# Patient Record
Sex: FEMALE | Race: WHITE | NOT HISPANIC OR LATINO | Employment: FULL TIME | URBAN - METROPOLITAN AREA
[De-identification: names, ages, dates, MRNs, and addresses within clinical notes are randomized per-mention and may not be internally consistent; named-entity substitution may affect disease eponyms.]

---

## 2017-03-06 ENCOUNTER — RECORDS - HEALTHEAST (OUTPATIENT)
Dept: LAB | Facility: CLINIC | Age: 27
End: 2017-03-06

## 2017-03-07 LAB — HIV 1+2 AB+HIV1 P24 AG SERPL QL IA: NEGATIVE

## 2017-03-08 LAB
HBV SURFACE AG SERPL QL IA: NEGATIVE
SYPHILIS RPR SCREEN - HISTORICAL: NORMAL

## 2017-03-10 LAB
HPV INTERPRETATION - HISTORICAL: NORMAL
HPV INTERPRETER - HISTORICAL: NORMAL

## 2017-03-13 ENCOUNTER — RECORDS - HEALTHEAST (OUTPATIENT)
Dept: ADMINISTRATIVE | Facility: OTHER | Age: 27
End: 2017-03-13

## 2017-05-24 ENCOUNTER — HOSPITAL ENCOUNTER (OUTPATIENT)
Dept: ULTRASOUND IMAGING | Facility: CLINIC | Age: 27
Discharge: HOME | End: 2017-05-24
Attending: FAMILY MEDICINE

## 2017-05-24 DIAGNOSIS — Z34.92 NORMAL PREGNANCY, SECOND TRIMESTER: ICD-10-CM

## 2017-05-24 DIAGNOSIS — Z64.1 MULTIGRAVIDA: ICD-10-CM

## 2017-07-10 ENCOUNTER — AMBULATORY - HEALTHEAST (OUTPATIENT)
Dept: SCHEDULING | Facility: CLINIC | Age: 27
End: 2017-07-10

## 2017-07-10 ENCOUNTER — RECORDS - HEALTHEAST (OUTPATIENT)
Dept: ADMINISTRATIVE | Facility: OTHER | Age: 27
End: 2017-07-10

## 2017-07-10 DIAGNOSIS — O28.0 ABNORMAL MATERNAL SERUM SCREENING TEST: ICD-10-CM

## 2017-07-17 ENCOUNTER — HOSPITAL ENCOUNTER (OUTPATIENT)
Dept: OBGYN | Facility: CLINIC | Age: 27
Discharge: HOME | End: 2017-07-17
Attending: FAMILY MEDICINE | Admitting: FAMILY MEDICINE

## 2017-07-24 ENCOUNTER — HOSPITAL ENCOUNTER (OUTPATIENT)
Dept: MEDSURG UNIT | Facility: CLINIC | Age: 27
Discharge: HOME | End: 2017-07-24
Attending: FAMILY MEDICINE | Admitting: FAMILY MEDICINE

## 2017-07-31 ENCOUNTER — HOSPITAL ENCOUNTER (OUTPATIENT)
Dept: MEDSURG UNIT | Facility: CLINIC | Age: 27
Discharge: HOME | End: 2017-07-31
Attending: FAMILY MEDICINE | Admitting: FAMILY MEDICINE

## 2017-08-03 ENCOUNTER — RECORDS - HEALTHEAST (OUTPATIENT)
Dept: ADMINISTRATIVE | Facility: OTHER | Age: 27
End: 2017-08-03

## 2017-08-04 ENCOUNTER — HOSPITAL ENCOUNTER (OUTPATIENT)
Dept: ULTRASOUND IMAGING | Facility: CLINIC | Age: 27
Discharge: HOME | End: 2017-08-04
Attending: FAMILY MEDICINE

## 2017-08-04 DIAGNOSIS — O28.9 ABNORMAL FINDINGS ON PRENATAL SCREENING: ICD-10-CM

## 2017-08-07 ENCOUNTER — HOSPITAL ENCOUNTER (OUTPATIENT)
Dept: MEDSURG UNIT | Facility: CLINIC | Age: 27
Discharge: HOME | End: 2017-08-07
Attending: FAMILY MEDICINE | Admitting: FAMILY MEDICINE

## 2017-08-14 ENCOUNTER — HOSPITAL ENCOUNTER (OUTPATIENT)
Dept: MEDSURG UNIT | Facility: CLINIC | Age: 27
Discharge: HOME | End: 2017-08-14
Attending: FAMILY MEDICINE | Admitting: FAMILY MEDICINE

## 2017-08-21 ENCOUNTER — HOSPITAL ENCOUNTER (OUTPATIENT)
Dept: MEDSURG UNIT | Facility: CLINIC | Age: 27
Discharge: HOME | End: 2017-08-21
Attending: FAMILY MEDICINE | Admitting: FAMILY MEDICINE

## 2017-08-28 ENCOUNTER — HOSPITAL ENCOUNTER (OUTPATIENT)
Dept: MEDSURG UNIT | Facility: CLINIC | Age: 27
Discharge: HOME | End: 2017-08-28
Attending: FAMILY MEDICINE | Admitting: FAMILY MEDICINE

## 2017-08-28 ASSESSMENT — MIFFLIN-ST. JEOR: SCORE: 1725.59

## 2017-09-04 ENCOUNTER — HOSPITAL ENCOUNTER (OUTPATIENT)
Dept: OBGYN | Facility: CLINIC | Age: 27
Discharge: HOME | End: 2017-09-04
Attending: FAMILY MEDICINE | Admitting: FAMILY MEDICINE

## 2017-09-11 ENCOUNTER — RECORDS - HEALTHEAST (OUTPATIENT)
Dept: ADMINISTRATIVE | Facility: OTHER | Age: 27
End: 2017-09-11

## 2017-09-11 ENCOUNTER — HOSPITAL ENCOUNTER (OUTPATIENT)
Dept: MEDSURG UNIT | Facility: CLINIC | Age: 27
Discharge: HOME | End: 2017-09-11
Attending: FAMILY MEDICINE | Admitting: FAMILY MEDICINE

## 2017-09-11 ASSESSMENT — MIFFLIN-ST. JEOR: SCORE: 1725.59

## 2017-09-12 ENCOUNTER — HOSPITAL ENCOUNTER (OUTPATIENT)
Dept: ULTRASOUND IMAGING | Facility: CLINIC | Age: 27
Discharge: HOME | End: 2017-09-12
Attending: FAMILY MEDICINE

## 2017-09-12 DIAGNOSIS — O28.0 ABNORMAL MSAFP (MATERNAL SERUM ALPHA-FETOPROTEIN), ELEVATED: ICD-10-CM

## 2017-09-18 ENCOUNTER — HOSPITAL ENCOUNTER (OUTPATIENT)
Dept: MEDSURG UNIT | Facility: CLINIC | Age: 27
Discharge: HOME | End: 2017-09-18
Attending: FAMILY MEDICINE | Admitting: FAMILY MEDICINE

## 2017-09-18 ASSESSMENT — MIFFLIN-ST. JEOR: SCORE: 1730.13

## 2017-09-25 ENCOUNTER — HOSPITAL ENCOUNTER (OUTPATIENT)
Dept: MEDSURG UNIT | Facility: CLINIC | Age: 27
Discharge: HOME | End: 2017-09-25
Attending: FAMILY MEDICINE | Admitting: FAMILY MEDICINE

## 2017-09-25 ENCOUNTER — HOSPITAL ENCOUNTER (OUTPATIENT)
Dept: OBGYN | Facility: CLINIC | Age: 27
Discharge: HOME | End: 2017-09-25

## 2017-10-05 ENCOUNTER — ANESTHESIA - HEALTHEAST (OUTPATIENT)
Dept: OBGYN | Facility: CLINIC | Age: 27
End: 2017-10-05

## 2017-10-07 ENCOUNTER — HOME CARE/HOSPICE - HEALTHEAST (OUTPATIENT)
Dept: HOME HEALTH SERVICES | Facility: HOME HEALTH | Age: 27
End: 2017-10-07

## 2017-10-08 ENCOUNTER — HOME CARE/HOSPICE - HEALTHEAST (OUTPATIENT)
Dept: HOME HEALTH SERVICES | Facility: HOME HEALTH | Age: 27
End: 2017-10-08

## 2018-03-07 ASSESSMENT — MIFFLIN-ST. JEOR: SCORE: 1634.87

## 2018-03-09 ENCOUNTER — ANESTHESIA - HEALTHEAST (OUTPATIENT)
Dept: SURGERY | Facility: CLINIC | Age: 28
End: 2018-03-09

## 2018-03-09 ENCOUNTER — SURGERY - HEALTHEAST (OUTPATIENT)
Dept: SURGERY | Facility: CLINIC | Age: 28
End: 2018-03-09

## 2019-06-13 ASSESSMENT — MIFFLIN-ST. JEOR: SCORE: 1625.8

## 2019-06-17 ENCOUNTER — SURGERY - HEALTHEAST (OUTPATIENT)
Dept: SURGERY | Facility: CLINIC | Age: 29
End: 2019-06-17

## 2019-06-17 ENCOUNTER — ANESTHESIA - HEALTHEAST (OUTPATIENT)
Dept: SURGERY | Facility: CLINIC | Age: 29
End: 2019-06-17

## 2019-06-17 ASSESSMENT — MIFFLIN-ST. JEOR: SCORE: 1621.83

## 2021-01-28 ENCOUNTER — RECORDS - HEALTHEAST (OUTPATIENT)
Dept: LAB | Facility: CLINIC | Age: 31
End: 2021-01-28

## 2021-01-29 LAB
C TRACH DNA SPEC QL PROBE+SIG AMP: POSITIVE
N GONORRHOEA DNA SPEC QL NAA+PROBE: NEGATIVE

## 2021-03-04 ENCOUNTER — RECORDS - HEALTHEAST (OUTPATIENT)
Dept: LAB | Facility: CLINIC | Age: 31
End: 2021-03-04

## 2021-03-05 LAB
C TRACH DNA SPEC QL PROBE+SIG AMP: POSITIVE
N GONORRHOEA DNA SPEC QL NAA+PROBE: NEGATIVE

## 2021-03-17 ENCOUNTER — RECORDS - HEALTHEAST (OUTPATIENT)
Dept: LAB | Facility: CLINIC | Age: 31
End: 2021-03-17

## 2021-03-18 LAB
C TRACH DNA SPEC QL PROBE+SIG AMP: NEGATIVE
HPV SOURCE: NORMAL
HUMAN PAPILLOMA VIRUS 16 DNA: NEGATIVE
HUMAN PAPILLOMA VIRUS 18 DNA: NEGATIVE
HUMAN PAPILLOMA VIRUS FINAL DIAGNOSIS: NORMAL
HUMAN PAPILLOMA VIRUS OTHER HR: NEGATIVE
N GONORRHOEA DNA SPEC QL NAA+PROBE: NEGATIVE
SPECIMEN DESCRIPTION: NORMAL

## 2021-03-24 LAB
BKR LAB AP ABNORMAL BLEEDING: NO
BKR LAB AP BIRTH CONTROL/HORMONES: NORMAL
BKR LAB AP CERVICAL APPEARANCE: NORMAL
BKR LAB AP GYN ADEQUACY: NORMAL
BKR LAB AP GYN INTERPRETATION: NORMAL
BKR LAB AP HPV REFLEX: NORMAL
BKR LAB AP LMP: NORMAL
BKR LAB AP PATIENT STATUS: NORMAL
BKR LAB AP PREVIOUS ABNORMAL: NORMAL
BKR LAB AP PREVIOUS NORMAL: 2017
HIGH RISK?: NO
PATH REPORT.COMMENTS IMP SPEC: NORMAL
RESULT FLAG (HE HISTORICAL CONVERSION): NORMAL

## 2021-05-10 ENCOUNTER — RECORDS - HEALTHEAST (OUTPATIENT)
Dept: LAB | Facility: CLINIC | Age: 31
End: 2021-05-10

## 2021-05-11 LAB
C TRACH DNA SPEC QL PROBE+SIG AMP: NEGATIVE
N GONORRHOEA DNA SPEC QL NAA+PROBE: NEGATIVE

## 2021-05-21 ENCOUNTER — RECORDS - HEALTHEAST (OUTPATIENT)
Dept: LAB | Facility: CLINIC | Age: 31
End: 2021-05-21

## 2021-05-21 LAB — HIV 1+2 AB+HIV1 P24 AG SERPL QL IA: NEGATIVE

## 2021-05-22 LAB
BACTERIA SPEC CULT: NO GROWTH
T PALLIDUM AB SER QL: NEGATIVE

## 2021-05-24 LAB
ARUP MISCELLANEOUS TEST: NORMAL
HSV1 IGG SERPL QL IA: POSITIVE
HSV2 IGG SERPL QL IA: NEGATIVE
MISCELLANEOUS TEST DEPT. - HE HISTORICAL: NORMAL
PERFORMING LAB: NORMAL
SPECIMEN STATUS: NORMAL
TEST NAME: NORMAL

## 2021-05-25 ENCOUNTER — RECORDS - HEALTHEAST (OUTPATIENT)
Dept: ADMINISTRATIVE | Facility: CLINIC | Age: 31
End: 2021-05-25

## 2021-05-25 LAB
C TRACH DNA SPEC QL PROBE+SIG AMP: NEGATIVE
N GONORRHOEA DNA SPEC QL NAA+PROBE: NEGATIVE

## 2021-05-29 ENCOUNTER — RECORDS - HEALTHEAST (OUTPATIENT)
Dept: ADMINISTRATIVE | Facility: CLINIC | Age: 31
End: 2021-05-29

## 2021-05-29 NOTE — ANESTHESIA CARE TRANSFER NOTE
Last vitals:   Vitals:    06/17/19 1130   BP: 112/69   Pulse: 64   Resp: 16   Temp: 36.2  C (97.2  F)   SpO2: 98%     Patient's level of consciousness is awake  Spontaneous respirations: yes  Maintains airway independently: yes  Dentition unchanged: yes  Oropharynx: oropharynx clear of all foreign objects    QCDR Measures:  ASA# 20 - Surgical No data recorded  PQRS# 430 - Adult PONV Prevention: 4558F - Pt received => 2 anti-emetic agents (different classes) preop & intraop  ASA# 8 - Peds PONV Prevention: NA - Not pediatric patient, not GA or 2 or more risk factors NOT present  PQRS# 424 - Adriana-op Temp Management: 4559F - At least one body temp DOCUMENTED => 35.5C or 95.9F within required timeframe  PQRS# 426 - PACU Transfer Protocol: - Transfer of care checklist used  ASA# 14 - Acute Post-op Pain: ASA14A - Patient experienced pain >= 7 out of 10

## 2021-05-29 NOTE — ANESTHESIA POSTPROCEDURE EVALUATION
Patient: Prudence Valencia  REMOVAL FOREIGN BODY LEFT UPPER ARM  Anesthesia type: MAC    Patient location: Phase II Recovery  Last vitals:   Vitals Value Taken Time   /69 6/17/2019 11:31 AM   Temp 36.2  C (97.2  F) 6/17/2019 11:30 AM   Pulse 69 6/17/2019 11:56 AM   Resp 16 6/17/2019 11:30 AM   SpO2 99 % 6/17/2019 11:56 AM   Vitals shown include unvalidated device data.  Post vital signs: stable  Level of consciousness: awake and responds to simple questions  Post-anesthesia pain: pain controlled  Post-anesthesia nausea and vomiting: no  Pulmonary: unassisted, return to baseline  Cardiovascular: stable and blood pressure at baseline  Hydration: adequate  Anesthetic events: no    QCDR Measures:  ASA# 11 - Adriana-op Cardiac Arrest: ASA11B - Patient did NOT experience unanticipated cardiac arrest  ASA# 12 - Adriana-op Mortality Rate: ASA12B - Patient did NOT die  ASA# 13 - PACU Re-Intubation Rate: NA - No ETT / LMA used for case  ASA# 10 - Composite Anes Safety: ASA10A - No serious adverse event    Additional Notes:

## 2021-05-29 NOTE — ANESTHESIA PREPROCEDURE EVALUATION
Anesthesia Evaluation      Patient summary reviewed   No history of anesthetic complications     Airway   Mallampati: II  Neck ROM: full   Pulmonary - negative ROS and normal exam    breath sounds clear to auscultation                         Cardiovascular - negative ROS  Rhythm: regular  Rate: normal,         Neuro/Psych - negative ROS     Endo/Other    (+) obesity,      GI/Hepatic/Renal - negative ROS           Dental - normal exam                        Anesthesia Plan  Planned anesthetic: MAC  Toradol 30 mg IV pre-emergence if ok with surgeon    ASA 2     Anesthetic plan and risks discussed with: patient  Anesthesia plan special considerations: antiemetics,   Post-op plan: routine recovery

## 2021-05-31 VITALS — HEIGHT: 64 IN | BODY MASS INDEX: 38.79 KG/M2

## 2021-05-31 VITALS — WEIGHT: 225 LBS | BODY MASS INDEX: 38.41 KG/M2 | HEIGHT: 64 IN

## 2021-05-31 VITALS — WEIGHT: 205 LBS | HEIGHT: 64 IN | BODY MASS INDEX: 35 KG/M2

## 2021-05-31 VITALS — HEIGHT: 64 IN | BODY MASS INDEX: 38.41 KG/M2 | WEIGHT: 225 LBS

## 2021-05-31 VITALS — HEIGHT: 64 IN | WEIGHT: 226 LBS | BODY MASS INDEX: 38.58 KG/M2

## 2021-06-02 ENCOUNTER — RECORDS - HEALTHEAST (OUTPATIENT)
Dept: ADMINISTRATIVE | Facility: CLINIC | Age: 31
End: 2021-06-02

## 2021-06-03 VITALS — HEIGHT: 64 IN | BODY MASS INDEX: 34.51 KG/M2 | WEIGHT: 202.13 LBS

## 2021-06-11 NOTE — PROGRESS NOTES
G3 P 2 at 27.5 weeks here for scheduled weekly NST.  EFM applied with consent.  Pt denies LOF, VB or contractions.  Baby is active.  1600  Tracing is cat 1, reactive.  Dr Anderson called.  Pt discharged home.  Her next NST is already scheduled for next week.

## 2021-06-12 NOTE — PROGRESS NOTES
1520 care assumed from Christina OLIVAS RN. Pt here for NST. Triage admission assessment completed. 1539 reactive NST. Call placed to Dr. Chapman, spoke with her assistant and informed of reactive NST. D/C instructions printed and reviewed with pt. Pt d/c to home ambulatory and undelivered.   Elaina Hurst RN

## 2021-06-12 NOTE — PROGRESS NOTES
"Pt to Labor and delivery for NST.  NST not currently ordered but pt had been having weekly NST's r/t \"abnormal lab work at 13 weeks\"  EFM explained and applied.  Will contact Dr. Chapman for a plan  "

## 2021-06-12 NOTE — PROGRESS NOTES
Pt here for her weekly NST for abnormal lab results at the beginning of pregnancy.  Pt denying concerns with fetal movement or contractions.  A RN at Dr. Chapman's office was updated on NST results: Cat 1 FHT tracing, no contractions, VSS.  Written orders in computer for discharge. Pt discharged to home.

## 2021-06-12 NOTE — PROGRESS NOTES
Pt here for NST,  Denies LOF, vaginal bleeding or decreased FM.  Accels noted with baby movement.  Category 1 tracing noted and Dr LYNDA Chapman updated and discharge orders given.  P given discharge instructions and questions answered.  Pt DC'd to home.

## 2021-06-12 NOTE — PROGRESS NOTES
Pt here for weekly NST.  Pt denies LOF, vaginal bldg or ctxns.  Accelerations noted with variability.  Dr Chapman updated.  Pt DC's to home

## 2021-06-12 NOTE — PROGRESS NOTES
here to [facility] [place]ST. Pt has been receiving NSTs weekly since 28wks for IUGR risk. EFM and TOCO applied. VS taken and stable.

## 2021-06-12 NOTE — PROGRESS NOTES
Pt here for weekly NST for abnormal  labs.  Pt denies LOF, vaginal bldg or decrease FM.  Reactive category 1 strip noted.  Pt given discharge instructions and Dr Chapman updated for discharge.

## 2021-06-13 NOTE — ANESTHESIA PROCEDURE NOTES
Epidural Block    Patient location during procedure: OB  Time Called: 10/5/2017 10:16 AM  Reason for Block:labor epidural  Staffing:  Performing  Anesthesiologist: MIGUELITO GALDAMEZ  Preanesthetic Checklist  Completed: patient identified, risks, benefits, and alternatives discussed, timeout performed, consent obtained, at patient's request, airway assessed, oxygen available, suction available, emergency drugs available and hand hygiene performed  Procedure  Patient position: sitting  Prep: ChloraPrep  Patient monitoring: continuous pulse oximetry, heart rate and blood pressure  Approach: midline  Location: L3-L4  Injection technique: LALA saline  Number of Attempts:1  Needle  Needle type: Narinder   Needle gauge: 18 G     Catheter in Space: 4  Assessment  Sensory level:  No complications

## 2021-06-13 NOTE — ANESTHESIA PREPROCEDURE EVALUATION
Anesthesia Evaluation      Patient summary reviewed   No history of anesthetic complications     Airway   Mallampati: I   Pulmonary - negative ROS and normal exam                          Cardiovascular - negative ROS and normal exam   Neuro/Psych - negative ROS     Endo/Other    (+) obesity, pregnant     GI/Hepatic/Renal - negative ROS           Dental - normal exam                        Anesthesia Plan  Planned anesthetic: epidural    ASA 2     Anesthetic plan and risks discussed with: spouse and patient    Post-op plan: routine recovery

## 2021-06-13 NOTE — ANESTHESIA POSTPROCEDURE EVALUATION
Patient: Prudence Valencia  * No procedures listed *  Anesthesia type: epidural    Patient location: room  Last vitals:   Vitals:    10/05/17 1735   BP: 140/66   Pulse: 90   Resp: (P) 18   Temp: (P) 36.9  C (98.4  F)   SpO2: (P) 99%     Post vital signs: stable  Level of consciousness: awake and responds to simple questions  Post-anesthesia pain: pain controlled  Post-anesthesia nausea and vomiting: no  Pulmonary: unassisted, return to baseline  Cardiovascular: stable and blood pressure at baseline  Hydration: adequate  Anesthetic events: no    QCDR Measures:  ASA# 11 - Adriana-op Cardiac Arrest: ASA11B - Patient did NOT experience unanticipated cardiac arrest  ASA# 12 - Adriana-op Mortality Rate: ASA12B - Patient did NOT die  ASA# 13 - PACU Re-Intubation Rate: ASA13B - Patient did NOT require a new airway mgmt  ASA# 10 - Composite Anes Safety: ASA10A - No serious adverse event    Additional Notes:

## 2021-06-13 NOTE — PROGRESS NOTES
Dr. Lolis Chapman's clinic was phoned and a message was given to her that her patient had a Cat 1 NST and will be discharged to home.

## 2021-06-16 NOTE — ANESTHESIA PREPROCEDURE EVALUATION
Anesthesia Evaluation      Patient summary reviewed   No history of anesthetic complications     Airway   Mallampati: II  Neck ROM: full   Pulmonary - negative ROS    breath sounds clear to auscultation                         Cardiovascular - negative ROS  Rhythm: regular  Rate: normal,         Neuro/Psych - negative ROS     Endo/Other    (+) obesity,      GI/Hepatic/Renal      Comments: cholelithiasis          Dental - normal exam                        Anesthesia Plan  Planned anesthetic: general endotracheal    ASA 2   Induction: intravenous   Anesthetic plan and risks discussed with: patient

## 2021-06-16 NOTE — ANESTHESIA POSTPROCEDURE EVALUATION
Patient: Prudence Valencia  CHOLECYSTECTOMY, LAPAROSCOPIC  Anesthesia type: general    Patient location: Phase II Recovery  Last vitals:   Vitals:    03/09/18 1230   BP: 132/85   Pulse: 67   Resp: 17   Temp: 36.8  C (98.2  F)   SpO2: 95%     Post vital signs: stable  Level of consciousness: awake and responds to simple questions  Post-anesthesia pain: pain controlled  Post-anesthesia nausea and vomiting: no  Pulmonary: unassisted, return to baseline  Cardiovascular: stable and blood pressure at baseline  Hydration: adequate  Anesthetic events: no    QCDR Measures:  ASA# 11 - Adriana-op Cardiac Arrest: ASA11B - Patient did NOT experience unanticipated cardiac arrest  ASA# 12 - Adriana-op Mortality Rate: ASA12B - Patient did NOT die  ASA# 13 - PACU Re-Intubation Rate: ASA13B - Patient did NOT require a new airway mgmt  ASA# 10 - Composite Anes Safety: ASA10A - No serious adverse event    Additional Notes:  PVCs with bigeminy briefly in PACU, self-resolved, K and Mg checked and WNL

## 2021-06-16 NOTE — ANESTHESIA CARE TRANSFER NOTE
Last vitals:   Vitals:    03/09/18 1149   BP: 139/82   Pulse: 65   Resp: 14   Temp: 36.9  C (98.5  F)   SpO2: 100%     Patient's level of consciousness is drowsy  Spontaneous respirations: yes  Maintains airway independently: yes  Dentition unchanged: yes  Oropharynx: oropharynx clear of all foreign objects    QCDR Measures:  ASA# 20 - Surgical Safety Checklist: WHO surgical safety checklist completed prior to induction  PQRS# 430 - Adult PONV Prevention: 4558F - Pt received => 2 anti-emetic agents (different classes) preop & intraop  ASA# 8 - Peds PONV Prevention: NA - Not pediatric patient, not GA or 2 or more risk factors NOT present  PQRS# 424 - Adriana-op Temp Management: 4559F - At least one body temp DOCUMENTED => 35.5C or 95.9F within required timeframe  PQRS# 426 - PACU Transfer Protocol: - Transfer of care checklist used  ASA# 14 - Acute Post-op Pain: ASA14B - Patient did NOT experience pain >= 7 out of 10

## 2021-07-14 PROBLEM — Z34.90 PREGNANT: Status: RESOLVED | Noted: 2017-10-04 | Resolved: 2017-10-06

## 2023-01-26 NOTE — PROGRESS NOTES
Pt here for NST d/t abnormal labs early in pregnancy, she states her baby is at risk for IUGR.  Monitors applied, procedure explained and reactive NST obtained.  Message left for Dr Chapman for d/c orders.  Awaiting return call.  
no

## 2023-08-12 ENCOUNTER — HEALTH MAINTENANCE LETTER (OUTPATIENT)
Age: 33
End: 2023-08-12

## 2024-02-25 ENCOUNTER — HOSPITAL ENCOUNTER (EMERGENCY)
Facility: CLINIC | Age: 34
Discharge: HOME | End: 2024-02-25
Payer: COMMERCIAL

## 2024-02-25 ENCOUNTER — APPOINTMENT (OUTPATIENT)
Dept: CT IMAGING | Facility: CLINIC | Age: 34
End: 2024-02-25
Payer: COMMERCIAL

## 2024-02-25 VITALS
TEMPERATURE: 98.1 F | HEART RATE: 78 BPM | OXYGEN SATURATION: 99 % | SYSTOLIC BLOOD PRESSURE: 137 MMHG | RESPIRATION RATE: 16 BRPM | DIASTOLIC BLOOD PRESSURE: 75 MMHG | HEIGHT: 64 IN | BODY MASS INDEX: 36.7 KG/M2 | WEIGHT: 215 LBS

## 2024-02-25 DIAGNOSIS — S00.11XA PERIORBITAL ECCHYMOSIS OF RIGHT EYE, INITIAL ENCOUNTER: ICD-10-CM

## 2024-02-25 DIAGNOSIS — S09.90XA HEAD INJURY, INITIAL ENCOUNTER: ICD-10-CM

## 2024-02-25 PROCEDURE — 99284 EMERGENCY DEPT VISIT MOD MDM: CPT | Mod: 25

## 2024-02-25 PROCEDURE — 70450 CT HEAD/BRAIN W/O DYE: CPT

## 2024-02-25 ASSESSMENT — COLUMBIA-SUICIDE SEVERITY RATING SCALE - C-SSRS
1. IN THE PAST MONTH, HAVE YOU WISHED YOU WERE DEAD OR WISHED YOU COULD GO TO SLEEP AND NOT WAKE UP?: NO
2. HAVE YOU ACTUALLY HAD ANY THOUGHTS OF KILLING YOURSELF IN THE PAST MONTH?: NO
6. HAVE YOU EVER DONE ANYTHING, STARTED TO DO ANYTHING, OR PREPARED TO DO ANYTHING TO END YOUR LIFE?: NO

## 2024-02-25 ASSESSMENT — ACTIVITIES OF DAILY LIVING (ADL): ADLS_ACUITY_SCORE: 33

## 2024-02-25 NOTE — DISCHARGE INSTRUCTIONS
You were seen at the Emergency Department today for your head injury.  You had a thorough workup that included a physical exam and a CT scan of the head.  Your CT scan does not show any signs of bleeding in the brain.      Rest, ice/cool compresses, ibuprofen or Tylenol as needed for pain.     Ibuprofen/Naproxen Discharge Instructions:  You may take ibuprofen for pain control.  The maximum dose of (ibuprofen is 3200 mg ) in a 24-hour period.    Take this medication with food to prevent stomach irritation.  With long-term use this medication can irritate the stomach causing pain and lead to development of a stomach ulcer.  If you notice stomach pain or vomiting of coffee-ground colored vomit or blood, please be seen by a healthcare provider.  Attempt to use this medication for the shortest time possible.      Tylenol (Acetaminophen) Discharge Instructions:  You may take 2 tablets of regular strength, over-the-counter, Tylenol (acetaminophen) every 4-6 hours as needed for pain.  Take no more than 4000 mg of Tylenol in a 24-hour period.      Avoid taking more than 1 acetaminophen-containing product at a time and be aware that many over-the-counter medications contain a combination of acetaminophen and other products.  If you are taking Tylenol in addition to a combination product please keep track of your daily acetaminophen dose to make sure you do not exceed the recommended 4000 mg.  Taking too much acetaminophen can cause permanent damage to your liver.    Please follow up with your primary care provider as needed.    Return to the ER if you develop any worsening headache, confusion, vomiting, dizziness, weakness/numbness/tingling of arms or legs, facial droop, speech difficulty, or other new symptoms.    Take Care!  - Sapphire Kc PA-C

## 2024-02-25 NOTE — ED TRIAGE NOTES
Tripped and fell while  walking up the stairs Friday night hitting her head on the wooden stair.  No LOC.  C/O pain and bruising right eye and forehead. Not taking blood thinners.     Triage Assessment (Adult)       Row Name 02/25/24 1303          Triage Assessment    Airway WDL WDL        Respiratory WDL    Respiratory WDL WDL        Skin Circulation/Temperature WDL    Skin Circulation/Temperature WDL WDL        Cardiac WDL    Cardiac WDL WDL        Peripheral/Neurovascular WDL    Peripheral Neurovascular WDL WDL        Cognitive/Neuro/Behavioral WDL    Cognitive/Neuro/Behavioral WDL WDL

## 2024-02-25 NOTE — ED PROVIDER NOTES
EMERGENCY DEPARTMENT ENCOUNTER      NAME: Prudence CHU  AGE: 33 year old female  YOB: 1990  MRN: [id number]  EVALUATION DATE & TIME: No admission date for patient encounter.    PCP: Lauren Becker    ED PROVIDER: Sapphire Kc PA-C      Chief Complaint   Patient presents with    Head Injury         FINAL IMPRESSION:  1. Head injury, initial encounter    2. Periorbital ecchymosis of right eye, initial encounter          ED COURSE & MEDICAL DECISION MAKIN:15 PM Met with patient for initial interview. Plan for care discussed.  2:45 PM Reevaluated and updated patient. I discussed the plan for discharge with the patient, and patient is agreeable. We discussed supportive cares at home and reasons for return to the ER including new or worsening symptoms. All questions and concerns addressed. Patient to be discharged by RN.    33 year old otherwise healthy female presents to the Emergency Department for evaluation after head injury. Patient reports a witnessed mechanical fall on Friday night or early Saturday morning when she was intoxicated and tripped going up the stairs.  She fell and hit her right eye on a wooden stair. No LOC per patient's partner; however, patient reports retrograde amnesia ~1 hour prior to the fall. No blood thinners. Upon exam, patient is afebrile, mildly hypertensive, but in no acute distress. Patient answers questions appropriately and exhibits no focal neurologic deficits. Using [place] CT Head Rule and shared decision making given intoxication and retrograde amnesia, plan to proceed with CT Head to rule out intracranial hemorrhage and orbital fracture. Based on patient's presenting symptoms, imaging was ordered. Patient declined analgesia upon arrival.     CT revealed negative. Head CT within 24hrs of bleed is 92% sensitive. The only way to be 100% is to perform an LP looking for blood in the CSF. Discussed limits of the Head CT as well as benefits and risks of  an LP with patient. Patient does not want to proceed with the LP.     Symptoms and workup most consistent with contusion. Patient was made aware of the above findings. Plan to discharge patient home with symptomatic management, concussion precautions, strict return precautions, and close follow up with their PCP for reevaluation and ongoing management. The patient was stable and well appearing upon discharge. The patient was advised to return to the ER if any new or worsening symptoms develop. The patient verbalizes understanding and agrees with the plan.       Medical Decision Making  Obtained supplemental history:Supplemental history obtained?: No  Reviewed external records: External records reviewed?: No  Care impacted by chronic illness:N/A  Care significantly affected by social determinants of health:N/A  Did you consider but not order tests?: Work up considered but not performed and documented in chart, if applicable  Did you interpret images independently?: Independent interpretation of ECG and images noted in documentation, when applicable.  Consultation discussion with other provider:Did you involve another provider (consultant, MH, pharmacy, etc.)?: No  Discharge. No recommendations on prescription strength medication(s). See documentation for any additional details.    MEDICATIONS GIVEN IN THE EMERGENCY:  Medications   Tdap (tetanus-diphtheria-acell pertussis) (ADACEL) injection 0.5 mL (has no administration in time range)       NEW PRESCRIPTIONS STARTED AT TODAY'S ER VISIT  New Prescriptions    No medications on file          =================================================================    HPI    Patient information was obtained from: patient    Use of : N/A       Prudence CHU is a 33 year old otherwise healthy female who presents to this ED for evaluation after head injury.  Patient reports a witnessed mechanical fall on Friday night or early Saturday morning when she was  "intoxicated and tripped going up the stairs.  She fell and hit her right eye on a wooden stair.  She did not fall down the stairs.  She notes a lump to the back of her head as well and believes she may have hit it on the wall.  She denies any loss of consciousness per her partner.  However, patient herself does not remember 1 hour prior to her fall.  Per patient's partner, she had no vomiting or confusion after the fall.  She reports throbbing pain to the back of her head, but no specific headache.  She also notes some tenderness around the right eye/eyebrow, but no orbital pain or involvement.  She denies any blood thinners.  She denies any vision changes, speech difficulty, numbness/weakness/tingling in the arms or legs, gait instability, or any other.      REVIEW OF SYSTEMS   Review of Systems see HPI    PAST MEDICAL HISTORY:  No past medical history on file.    PAST SURGICAL HISTORY:  Past Surgical History:   Procedure Laterality Date    LAPAROSCOPIC CHOLECYSTECTOMY N/A 3/9/2018    Procedure: CHOLECYSTECTOMY, LAPAROSCOPIC;  Surgeon: Erwin Houston MD;  Location: [facility];  Service:     TONSILLECTOMY      TONSILLECTOMY, ADENOIDECTOMY, MYRINGOTOMY, INSERT TUBE BILATERAL, COMBINED             CURRENT MEDICATIONS:    levonorgestrel-ethinyl estradiol (AVIANE,ALESSE,LESSINA) 0.1-20 mg-mcg per tablet        ALLERGIES:  No Known Allergies    FAMILY HISTORY:  No family history on file.    SOCIAL HISTORY:   Social History     Socioeconomic History    Marital status:    Tobacco Use    Smoking status: Never    Smokeless tobacco: Never   Substance and Sexual Activity    Alcohol use: Yes     Comment: Alcoholic Drinks/day: rare    Drug use: No    Sexual activity: Yes     Partners: Male       VITALS:  BP (!) 147/79   Pulse 82   Temp 97.4  F (36.3  C) (Temporal)   Resp 16   Ht 1.626 m (5' 4\")   Wt 97.5 kg (215 lb)   LMP 02/01/2024 (Exact Date)   SpO2 97%   BMI 36.90 kg/m      PHYSICAL EXAM  "   Constitutional:  Alert, in no acute distress. Cooperative.  EYES: Right supraorbital ecchymosis and mild edema with tenderness to palpation mid eyebrow. Conjunctivae clear. PERRL. EOM intact without pain. Peripheral fields intact. Left eye without ecchymosis.   HENT:  Atraumatic, normocephalic. Oropharynx clear. Moist membranes. Tongue without deviation. No boo signs. No hemotympanum.   Respiratory:  Respirations even, unlabored, in no acute respiratory distress. Lungs clear to auscultation bilaterally without wheeze, rhonchi, or rales. No cough. Speaks in full sentences easily.  Cardiovascular:  Regular rate and rhythm, good peripheral perfusion. No peripheral edema. No chest wall tenderness.  GI: Soft, flat, non-distended.  Musculoskeletal:  No edema. No cyanosis. Range of motion major extremities intact.    Integument: Warm, Dry. No rash to visualized skin.   Neurologic:  Alert & oriented x4. No focal deficits noted. GCS 15. Sensation intact. Motor intact. Coordination intact. 5/5 strength.   Psych: Normal mood and affect.      LAB:  All pertinent labs reviewed and interpreted.  Results for orders placed or performed during the hospital encounter of 02/25/24   CT Head w/o Contrast    Impression    IMPRESSION:  1.  Normal head CT.       RADIOLOGY:  Reviewed all pertinent imaging. Please see official radiology report.  CT Head w/o Contrast   Final Result   IMPRESSION:   1.  Normal head CT.        Sapphire Kc PA-C  [facility] EMERGENCY ROOM  [address] [facility] [address] [phone]      Sapphire Kc PA-C  02/25/24 4027       Sapphire Kc PA-C  02/25/24 0300

## 2024-10-05 ENCOUNTER — HEALTH MAINTENANCE LETTER (OUTPATIENT)
Age: 34
End: 2024-10-05

## 2025-03-15 ENCOUNTER — HOSPITAL ENCOUNTER (EMERGENCY)
Facility: CLINIC | Age: 35
Discharge: HOME | End: 2025-03-15
Attending: EMERGENCY MEDICINE | Admitting: EMERGENCY MEDICINE
Payer: COMMERCIAL

## 2025-03-15 VITALS
HEART RATE: 71 BPM | OXYGEN SATURATION: 99 % | HEIGHT: 64 IN | RESPIRATION RATE: 18 BRPM | TEMPERATURE: 97.7 F | BODY MASS INDEX: 40.97 KG/M2 | WEIGHT: 240 LBS | DIASTOLIC BLOOD PRESSURE: 89 MMHG | SYSTOLIC BLOOD PRESSURE: 137 MMHG

## 2025-03-15 DIAGNOSIS — B36.9 FUNGAL RASH OF TORSO: ICD-10-CM

## 2025-03-15 PROCEDURE — 99283 EMERGENCY DEPT VISIT LOW MDM: CPT

## 2025-03-15 PROCEDURE — 250N000013 HC RX MED GY IP 250 OP 250 PS 637: Performed by: EMERGENCY MEDICINE

## 2025-03-15 RX ORDER — ITRACONAZOLE 100 MG/1
200 CAPSULE ORAL DAILY
Qty: 20 CAPSULE | Refills: 0 | Status: SHIPPED | OUTPATIENT
Start: 2025-03-15 | End: 2025-03-25

## 2025-03-15 RX ORDER — THERMOMETER, ELECTRONIC,ORAL
EACH MISCELLANEOUS 2 TIMES DAILY
Qty: 14 G | Refills: 0 | Status: SHIPPED | OUTPATIENT
Start: 2025-03-15 | End: 2025-03-29

## 2025-03-15 RX ORDER — ITRACONAZOLE 100 MG/1
200 CAPSULE ORAL ONCE
Status: COMPLETED | OUTPATIENT
Start: 2025-03-16 | End: 2025-03-15

## 2025-03-15 RX ADMIN — ITRACONAZOLE 200 MG: 100 CAPSULE ORAL at 23:43

## 2025-03-16 NOTE — DISCHARGE INSTRUCTIONS
Take the new medications as written.  Call and speak with the dermatologist on Monday.  You can stop taking the turbine fine.  Switch to the cream and the pill.

## 2025-03-16 NOTE — ED TRIAGE NOTES
Rash noted to left forearm since November. Was seen by dermatology and given antifungal pill and cream. Has been on medications for about 2 weeks. Rash is now spreading to left bicep and toward wrist with burning.      Triage Assessment (Adult)       Row Name 03/15/25 5287          Triage Assessment    Airway WDL WDL        Respiratory WDL    Respiratory WDL WDL        Skin Circulation/Temperature WDL    Skin Circulation/Temperature WDL WDL        Cardiac WDL    Cardiac WDL WDL        Peripheral/Neurovascular WDL    Peripheral Neurovascular WDL WDL        Cognitive/Neuro/Behavioral WDL    Cognitive/Neuro/Behavioral WDL WDL

## 2025-03-16 NOTE — ED PROVIDER NOTES
EMERGENCY DEPARTMENT ENCOUNTER      NAME: Prudence Anthony  AGE: 34 year old female  YOB: 1990  MRN: [id number]  EVALUATION DATE & TIME: 3/15/2025 11:05 PM    PCP: Lauren Becker    ED PROVIDER: Ita Stewart M.D.      Chief Complaint   Patient presents with    Rash         FINAL IMPRESSION:  1. Fungal rash of left arm        MEDICAL DECISION MAKING:    Pertinent Labs & Imaging studies reviewed. (See chart for details)  ED Course as of 03/15/25 2342   Sat Mar 15, 2025   2314 Outside records reviewed primary care doctor's office 3/23/2012   2316 I met with the patient to gather history and perform my exam. ED course and treatment discussed.    2323 Afebrile.  Vital signs here are unremarkable.  Patient is presenting with rash to her left forearm.  Ongoing since November when she had a new tattoo.  Has been in to see primary care doctor.  Initially started on 2 weeks of clobetasol and had no improvement.  Followed up with dermatology on 3//25.  Had a biopsy done in the area that showed fungal infection.  Started on terbinafine 250 mg every day for 2 weeks.  She states that she has about 3 tablets left and states that the rash seems to be worsening.  Spreading down towards the wrist and up towards her axilla.  Itchy, red, burning sensation.  No fevers chills.    Physical exam for patient here with large erythematous, dry scaly appearing rash left forearm with forearm tattoo adenitis spreading from her lower bicep down to her wrist.  Is not circumferential.    Patient here did have her pathology report from the dermatologist.  Looks like here she is continuing to get worse despite the turbine fine.  She has been taking it.  Will change medication to itraconazole.  Will give her a dose here, will have her follow-up with dermatology this week.         Medical Decision Making  Obtained supplemental history:Supplemental history obtained?: No  Reviewed external records: External records reviewed?:  Documented in chart and Outpatient Record: primary care doctor's office 3/23/2012.   Care impacted by chronic illness:Documented in Chart  Did you consider but not order tests?: Work up considered but not performed and documented in chart, if applicable  Did you interpret images independently?: Independent interpretation of ECG and images noted in documentation, when applicable.  Consultation discussion with other provider:Did you involve another provider (consultant, , pharmacy, etc.)?: I discussed the care with another health care provider, see documentation for details.  Discharge. I prescribed additional prescription strength medication(s) as charted. See documentation for any additional details.    MIPS (CTPE, Dental pain, Bell, Sinusitis, Asthma/COPD, Head Trauma): Not Applicable    SEPSIS: None          Critical care: 0 minutes excluding separately billable procedures.  Includes bedside management, time reviewing test results, review of records, discussing the case with staff, documenting the medical record and time spent with family members (or surrogate decision makers) discussing specific treatment issues.        The importance of close follow up was discussed. We reviewed warning signs and symptoms, and I instructed Ms. Anthony to return to the emergency department immediately if she develops any new or worsening symptoms. I provided additional verbal discharge instructions. Ms. Anthony expressed understanding and agreement with this plan of care, her questions were answered, and she was discharged in stable condition.     MEDICATIONS GIVEN IN THE EMERGENCY:  Medications   itraconazole (SPORANOX) capsule 200 mg (has no administration in time range)       NEW PRESCRIPTIONS STARTED AT TODAY'S ER VISIT:  New Prescriptions    ITRACONAZOLE (SPORANOX) 100 MG CAPSULE    Take 2 capsules (200 mg) by mouth daily for 10 days.    TOLNAFTATE (TINACTIN) 1 % EXTERNAL CREAM    Apply topically 2 times daily for 14  days.          =================================================================    HPI    Patient information was obtained from: Patient     Use of : N/A       Prudence Anthony is a 34 year old female who presents with a rash    The patient reports a rash to her left forearm that has been ongoing since November after getting a new tattoo.  She has been to see her primary care doctor and started on 2 weeks of clobetasol without improvement.  She followed up with dermatology on 3/12 that showed fungal infection and started on terbinafine 250 mg daily for 2 weeks.  The patient has taken most of the prescription and feels that the rash is worsening and is now spreading down towards the wrist and up towards her left axilla.  The patient reports the rash is itchy, red, and burning.  The patient denies any fevers or chills.    REVIEW OF SYSTEMS   Refer to HPI. All other systems negative.      PAST MEDICAL HISTORY:  No past medical history on file.    PAST SURGICAL HISTORY:  Past Surgical History:   Procedure Laterality Date    LAPAROSCOPIC CHOLECYSTECTOMY N/A 3/9/2018    Procedure: CHOLECYSTECTOMY, LAPAROSCOPIC;  Surgeon: Erwin Houston MD;  Location: [facility];  Service:     TONSILLECTOMY      TONSILLECTOMY, ADENOIDECTOMY, MYRINGOTOMY, INSERT TUBE BILATERAL, COMBINED         CURRENT MEDICATIONS:      Current Facility-Administered Medications:     [START ON 3/16/2025] itraconazole (SPORANOX) capsule 200 mg, 200 mg, Oral, Once, Ita Stewart MD    Current Outpatient Medications:     itraconazole (SPORANOX) 100 MG capsule, Take 2 capsules (200 mg) by mouth daily for 10 days., Disp: 20 capsule, Rfl: 0    tolnaftate (TINACTIN) 1 % external cream, Apply topically 2 times daily for 14 days., Disp: 14 g, Rfl: 0    levonorgestrel-ethinyl estradiol (AVIANE,ALESSE,LESSINA) 0.1-20 mg-mcg per tablet, [LEVONORGESTREL-ETHINYL ESTRADIOL (AVIANE,ALESSE,LESSINA) 0.1-20 MG-MCG PER TABLET] Take 1 tablet by mouth  "at bedtime., Disp: , Rfl:     ALLERGIES:  No Known Allergies    FAMILY HISTORY:  No family history on file.    SOCIAL HISTORY:   Social History     Socioeconomic History    Marital status:    Tobacco Use    Smoking status: Never    Smokeless tobacco: Never   Substance and Sexual Activity    Alcohol use: Yes     Comment: Alcoholic Drinks/day: rare    Drug use: No    Sexual activity: Yes     Partners: Male       PHYSICAL EXAM:    Vitals: /86   Pulse 84   Temp 97.7  F (36.5  C) (Oral)   Resp 18   Ht 1.626 m (5' 4\")   Wt 108.9 kg (240 lb)   SpO2 98%   BMI 41.20 kg/m     General:. Alert and interactive, comfortable appearing.  Eyes: Pupils mid-sized and equally reactive.   Neck: Full AROM.   Cardiovascular: Regular rate and rhythm. Peripheral pulses 2+ bilaterally.  Chest/Pulmonary: Normal work of breathing.  Skin:  large erythematous, dry scaly appearing rash left forearm with forearm tattoo adenitis spreading from her lower bicep down to her wrist.  Is not circumferential.   Neuro: Speech clear. CNs grossly intact. Moves all extremities appropriately. Strength and sensation grossly intact to all extremities.   Psych: Normal affect/mood, cooperative, memory appropriate.     LAB:  All pertinent labs reviewed and interpreted.  Labs Ordered and Resulted from Time of ED Arrival to Time of ED Departure - No data to display    RADIOLOGY:  No orders to display       EKG:   I reviewed and independently interpreted the patient's EKG, with comments listed in the MDM.    PROCEDURES:         I, Imelda Dodge, am serving as a scribe to document services personally performed by Dr. Ita Stewart  based on my observation and the provider's statements to me. I, Ita Stewart MD attest that Imelda Dodge is acting in a scribe capacity, has observed my performance of the services and has documented them in accordance with my direction.      Ita Stewart M.D.  Emergency Medicine  [facility]" [facility] EMERGENCY ROOM  [address] [address]  [phone]  Dept: [phone]     Ita Stewart MD  03/15/25 9928

## 2025-07-22 ENCOUNTER — OFFICE VISIT (OUTPATIENT)
Dept: URGENT CARE | Facility: URGENT CARE | Age: 35
End: 2025-07-22
Payer: COMMERCIAL

## 2025-07-22 VITALS
RESPIRATION RATE: 18 BRPM | OXYGEN SATURATION: 95 % | HEART RATE: 92 BPM | SYSTOLIC BLOOD PRESSURE: 118 MMHG | DIASTOLIC BLOOD PRESSURE: 83 MMHG | TEMPERATURE: 98.1 F

## 2025-07-22 DIAGNOSIS — S21.039A: Primary | ICD-10-CM

## 2025-07-22 PROCEDURE — 99203 OFFICE O/P NEW LOW 30 MIN: CPT | Performed by: FAMILY MEDICINE

## 2025-07-22 PROCEDURE — 3079F DIAST BP 80-89 MM HG: CPT | Performed by: FAMILY MEDICINE

## 2025-07-22 PROCEDURE — 3074F SYST BP LT 130 MM HG: CPT | Performed by: FAMILY MEDICINE

## 2025-07-22 RX ORDER — CEPHALEXIN 500 MG/1
500 CAPSULE ORAL 3 TIMES DAILY
Qty: 21 CAPSULE | Refills: 0 | Status: SHIPPED | OUTPATIENT
Start: 2025-07-22 | End: 2025-07-29

## 2025-07-22 NOTE — PROGRESS NOTES
Urgent Care Clinic Visit    Chief Complaint   Patient presents with    Infection     possible infection of bilateral nipples/ got them pierced 6 wks ago

## 2025-07-23 ENCOUNTER — LAB REQUISITION (OUTPATIENT)
Dept: LAB | Facility: CLINIC | Age: 35
End: 2025-07-23

## 2025-07-23 DIAGNOSIS — Z11.3 ENCOUNTER FOR SCREENING FOR INFECTIONS WITH A PREDOMINANTLY SEXUAL MODE OF TRANSMISSION: ICD-10-CM

## 2025-07-23 LAB
BACTERIAL VAGINOSIS VAG-IMP: NEGATIVE
CANDIDA DNA VAG QL NAA+PROBE: NOT DETECTED
CANDIDA GLABRATA / CANDIDA KRUSEI DNA: NOT DETECTED
T VAGINALIS DNA VAG QL NAA+PROBE: NOT DETECTED

## 2025-07-23 PROCEDURE — 86780 TREPONEMA PALLIDUM: CPT

## 2025-07-23 PROCEDURE — 87491 CHLMYD TRACH DNA AMP PROBE: CPT

## 2025-07-23 PROCEDURE — 87389 HIV-1 AG W/HIV-1&-2 AB AG IA: CPT

## 2025-07-23 PROCEDURE — 81515 NFCT DS BV&VAGINITIS DNA ALG: CPT

## 2025-07-23 NOTE — PROGRESS NOTES
Assessment & Plan     Piercing of nipple  Poss infection  - cephALEXin (KEFLEX) 500 MG capsule  Dispense: 21 capsule; Refill: 0             No follow-ups on file.    Perfecto Bailey MD  [facility] is [facility] 34 year old female who presents to clinic today for the following health issues:  Chief Complaint   Patient presents with    Infection     possible infection of bilateral nipples/ got them pierced 6 wks ago       HPI    Concern about infection of piercing  Discharge  Right nipple pain/discharge  Left nipple pain        Review of Systems        Objective    /83   Pulse 92   Temp 98.1  F (36.7  C) (Oral)   Resp 18   SpO2 95%   Physical Exam  Vitals and nursing note reviewed.   Constitutional:       Appearance: Normal appearance.   Skin:     Comments: Some reddness and discharge from right nipple  Crusty on left nipple   Neurological:      Mental Status: She is alert.

## 2025-07-24 LAB
C TRACH DNA SPEC QL PROBE+SIG AMP: NEGATIVE
HIV 1+2 AB+HIV1 P24 AG SERPL QL IA: NONREACTIVE
N GONORRHOEA DNA SPEC QL NAA+PROBE: NEGATIVE
SPECIMEN TYPE: NORMAL
T PALLIDUM AB SER QL: NONREACTIVE

## 2025-08-27 ENCOUNTER — OFFICE VISIT (OUTPATIENT)
Dept: MIDWIFE SERVICES | Facility: CLINIC | Age: 35
End: 2025-08-27
Payer: COMMERCIAL

## 2025-08-27 VITALS
WEIGHT: 212.9 LBS | DIASTOLIC BLOOD PRESSURE: 74 MMHG | BODY MASS INDEX: 35.47 KG/M2 | SYSTOLIC BLOOD PRESSURE: 120 MMHG | HEIGHT: 65 IN | HEART RATE: 80 BPM

## 2025-08-27 DIAGNOSIS — Z01.812 PRE-PROCEDURE LAB EXAM: ICD-10-CM

## 2025-08-27 DIAGNOSIS — Z30.09 GENERAL COUNSELING AND ADVICE FOR CONTRACEPTIVE MANAGEMENT: Primary | ICD-10-CM

## 2025-08-27 DIAGNOSIS — Z30.430 ENCOUNTER FOR INSERTION OF INTRAUTERINE CONTRACEPTIVE DEVICE: ICD-10-CM

## 2025-08-27 LAB — HCG UR QL: NEGATIVE

## 2025-08-27 PROCEDURE — 99202 OFFICE O/P NEW SF 15 MIN: CPT | Mod: 25 | Performed by: ADVANCED PRACTICE MIDWIFE

## 2025-08-27 PROCEDURE — 58300 INSERT INTRAUTERINE DEVICE: CPT | Performed by: ADVANCED PRACTICE MIDWIFE

## 2025-08-27 PROCEDURE — 81025 URINE PREGNANCY TEST: CPT | Performed by: ADVANCED PRACTICE MIDWIFE

## 2025-08-27 PROCEDURE — 3078F DIAST BP <80 MM HG: CPT | Performed by: ADVANCED PRACTICE MIDWIFE

## 2025-08-27 PROCEDURE — 3074F SYST BP LT 130 MM HG: CPT | Performed by: ADVANCED PRACTICE MIDWIFE
